# Patient Record
Sex: FEMALE | Race: WHITE | Employment: OTHER | URBAN - NONMETROPOLITAN AREA
[De-identification: names, ages, dates, MRNs, and addresses within clinical notes are randomized per-mention and may not be internally consistent; named-entity substitution may affect disease eponyms.]

---

## 2018-11-08 DIAGNOSIS — H91.93 DECREASED HEARING OF BOTH EARS: Primary | ICD-10-CM

## 2019-02-05 NOTE — PROGRESS NOTES
Otolaryngology Consultation    Patient: Tayler Lizarraga  : 1922    Patient presents with:  Hearing Problem: Pt is here for hearing loss and hearing aid medical clearance.      HPI:  Tayler Lizarraga is a 89 year old female seen today for hearing loss and medical clearance  Tayler has hx of hearing aids for the last 10 years and no longer able to be adjusted. SHe is requesting new aids.   Tayler had hearing loss for several years and noted loss due to illness.   Patient does report she had scarlet fever which contributed to her hearing loss.   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   [facility] otologic surgeries  Hx of Tonsillectomy an Adenoidectomy due to illness.   Denies otorrhea, otalgia. Denies tinnitus.   Denies vertigo. She has Meclizine for PRN use.   No facial paraesthesia or dysphagia.      Hx of hearing loss with her parents.   Allergies   Allergen Reactions     Amoxicillin Hives     Codeine Hives     Latanoprost Other (See Comments)     Itching, burning (not with travatan)     Nitrofurantoin Hives     Sulfacetamide Hives     Tramadol Nausea and Vomiting     Current Outpatient Medications   Medication     Acetaminophen (TYLENOL GO TABS EXTRA STRENGTH PO)     albuterol (PROVENTIL) (2.5 MG/3ML) 0.083% neb solution     aspirin (ASA) 81 MG chewable tablet     atorvastatin (LIPITOR) 20 MG tablet     bimatoprost (LUMIGAN) 0.01 % SOLN     budesonide-formoterol (SYMBICORT) 80-4.5 MCG/ACT Inhaler     calcium carbonate 600 mg-vitamin D 400 units (CALTRATE) 600-400 MG-UNIT per tablet     Cranberry-Vitamin C 250-60 MG CAPS     indomethacin (INDOCIN) 50 MG capsule     ipratropium - albuterol 0.5 mg/2.5 mg/3 mL (DUONEB) 0.5-2.5 (3) MG/3ML neb solution     magnesium hydroxide (MILK OF MAGNESIA) 400 MG/5ML suspension     Magnesium Oxide 250 MG TABS     metolazone (ZAROXOLYN) 2.5 MG tablet     No current facility-administered medications for this visit.        ENT family history reviewed    Social History      Tobacco Use     Smoking status: Not on file   Substance Use Topics     Alcohol use: Not on file     Drug use: Not on file       Review of Systems  ROS: 10 point ROS neg other than the symptoms noted above in the HPI     Physical Exam  /72 (BP Location: Right arm, Patient Position: Sitting, Cuff Size: Adult Regular)   Pulse 71   Temp 98.1  F (36.7  C)   Wt 71 kg (156 lb 8 oz)   SpO2 97%   General - The patient is well nourished and well developed, and appears to have good nutritional status.  Alert and oriented to person and place, answers questions and cooperates with examination appropriately.  Patient is in wheelchair but transfers and ambulates without concern.   Head and Face - Normocephalic and atraumatic, with no gross asymmetry noted.  The facial nerve is intact, with strong symmetric movements.  Voice and Breathing - The patient was breathing comfortably without the use of accessory muscles. There was no wheezing, stridor, or stertor.  The patients voice was clear and strong, and had appropriate pitch and quality.  Ears -The external auditory canals are patent, the tympanic membranes are intact without effusion, retraction or mass.  Bony landmarks are intact. Ears examined under otologic microscopy using otologic speculum, scant cerumen removed. \  Mouth - Examination of the oral cavity showed pink, healthy oral mucosa. No lesions or ulcerations noted.  The tongue was mobile and midline, and the dentition was dentures.   Throat - The walls of the oropharynx were smooth, pink, moist, symmetric, and had no lesions or ulcerations.  The tonsillar pillars and soft palate were symmetric.  The uvula was midline on elevation.    Neck - Normal midline excursion of the laryngotracheal complex during swallowing.  Full range of motion on passive movement.  Palpation of the occipital, submental, submandibular, internal jugular chain, and supraclavicular nodes did not demonstrate any abnormal lymph nodes or  masses.  Palpation of the thyroid was soft and smooth, with no nodules or goiter appreciated.  The trachea was mobile and midline.  Nose - External contour is symmetric, no gross deflection or scars.  Nasal mucosa is pink and moist with no abnormal mucus.  The septum and turbinates were evaluated: No polyps, masses, or purulence noted on examination.    Audiogram  Type Ad Right Type A left  Thresholds are severe right mixed loss and severe to profound mixed loss left.   WRS- poor right and very poor left.       ASSESSMENT:    ICD-10-CM    1. 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 Asymmetrical hearing loss of both ears H91.8X3        Discussed options with Tayler today. She does have noted asymmetry with her hearing. Reviewed consideration for imaging. However, she declines today due to her age and she wishes to obtain hearing aids.     Medical clearance was provided.       I have discussed options with the patient.  MRI of the IAC was declined.  The loss of hearing in the one ear leaves only one that is functioning well.  It becomes imperative that the remaining ear be protected from noise exposure and if hearing loss were to occur in the remaining ear, the patient needs to be seen in a few days so as to commence timely work-up and management.  The specifics of this were reviewed with the patient.      She agrees with this plan.   Return to ENT PRN.       Abigail Mckenzie PA-C  ENT  [facility], [place]  [phone]

## 2019-02-06 ENCOUNTER — OFFICE VISIT (OUTPATIENT)
Dept: AUDIOLOGY | Facility: OTHER | Age: 84
End: 2019-02-06
Attending: AUDIOLOGIST
Payer: MEDICARE

## 2019-02-06 ENCOUNTER — OFFICE VISIT (OUTPATIENT)
Dept: OTOLARYNGOLOGY | Facility: OTHER | Age: 84
End: 2019-02-06
Attending: AUDIOLOGIST
Payer: MEDICARE

## 2019-02-06 VITALS
OXYGEN SATURATION: 97 % | SYSTOLIC BLOOD PRESSURE: 126 MMHG | DIASTOLIC BLOOD PRESSURE: 72 MMHG | TEMPERATURE: 98.1 F | HEART RATE: 71 BPM | WEIGHT: 156.5 LBS

## 2019-02-06 DIAGNOSIS — H90.6 MIXED HEARING LOSS, BILATERAL: Primary | ICD-10-CM

## 2019-02-06 DIAGNOSIS — H90.3 SENSORINEURAL HEARING LOSS, ASYMMETRICAL: Primary | ICD-10-CM

## 2019-02-06 PROCEDURE — 92557 COMPREHENSIVE HEARING TEST: CPT | Performed by: AUDIOLOGIST

## 2019-02-06 PROCEDURE — 92550 TYMPANOMETRY & REFLEX THRESH: CPT | Performed by: AUDIOLOGIST

## 2019-02-06 PROCEDURE — V5275 EAR IMPRESSION: HCPCS | Performed by: AUDIOLOGIST

## 2019-02-06 PROCEDURE — 99214 OFFICE O/P EST MOD 30 MIN: CPT | Performed by: PHYSICIAN ASSISTANT

## 2019-02-06 PROCEDURE — G0463 HOSPITAL OUTPT CLINIC VISIT: HCPCS | Mod: 25

## 2019-02-06 RX ORDER — IPRATROPIUM BROMIDE AND ALBUTEROL SULFATE 2.5; .5 MG/3ML; MG/3ML
SOLUTION RESPIRATORY (INHALATION)
COMMUNITY
Start: 2019-01-03

## 2019-02-06 RX ORDER — INDOMETHACIN 50 MG/1
CAPSULE ORAL
Refills: 0 | COMMUNITY
Start: 2018-10-02

## 2019-02-06 RX ORDER — ATORVASTATIN CALCIUM 20 MG/1
TABLET, FILM COATED ORAL
COMMUNITY
Start: 2019-01-31

## 2019-02-06 RX ORDER — METOLAZONE 2.5 MG/1
TABLET ORAL
COMMUNITY
Start: 2018-06-15

## 2019-02-06 RX ORDER — LIDOCAINE HCL 4 %
CREAM (GRAM) TOPICAL
COMMUNITY
Start: 2018-12-13

## 2019-02-06 RX ORDER — ASPIRIN 81 MG/1
TABLET, CHEWABLE ORAL
COMMUNITY
Start: 2018-08-30

## 2019-02-06 RX ORDER — BUDESONIDE AND FORMOTEROL FUMARATE DIHYDRATE 80; 4.5 UG/1; UG/1
2 AEROSOL RESPIRATORY (INHALATION)
COMMUNITY
Start: 2019-01-03

## 2019-02-06 RX ORDER — ALBUTEROL SULFATE 0.83 MG/ML
SOLUTION RESPIRATORY (INHALATION)
COMMUNITY
Start: 2015-05-12

## 2019-02-06 ASSESSMENT — PAIN SCALES - GENERAL: PAINLEVEL: NO PAIN (0)

## 2019-02-06 NOTE — PROGRESS NOTES
Audiology Evaluation Completed. Please refer SCANNED AUDIOGRAM and/or TYMPANOGRAM for BACKGROUND, RESULTS, RECOMMENDATIONS.      Rosio DICKENS, [facility]-A  Audiologist #[id number]

## 2019-02-06 NOTE — NURSING NOTE
Chief Complaint   Patient presents with     Hearing Problem     Pt is here for hearing loss and hearing aid medical clearance.       Initial /72 (BP Location: Right arm, Patient Position: Sitting, Cuff Size: Adult Regular)   Pulse 71   Temp 98.1  F (36.7  C)   Wt 71 kg (156 lb 8 oz)   SpO2 97%  There is no height or weight on file to calculate BMI.  Medication Reconciliation: complete    Hanna Galeano LPN

## 2019-02-06 NOTE — LETTER
2019         RE: Tayler Lizarraga  [address]  [place] [place] [address]        Dear Colleague,    Thank you for referring your patient, Tayler Lizarraga, to the [facility]. Please see a copy of my visit note below.    Otolaryngology Consultation    Patient: Tayler Lizarraga  : 1922    Patient presents with:  Hearing Problem: Pt is here for hearing loss and hearing aid medical clearance.      HPI:  Tayler Lizarraga is a 89 year old female seen today for hearing loss and medical clearance  Tayler has hx of hearing aids for the last 10 years and no longer able to be adjusted. SHe is requesting new aids.   Tayler had hearing loss for several years and noted loss due to illness.   Patient does report she had scarlet fever which contributed to her hearing loss.   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   Scott otologic surgeries  Hx of Tonsillectomy an Adenoidectomy due to illness.   Denies otorrhea, otalgia. Denies tinnitus.   Denies vertigo. She has Meclizine for PRN use.   No facial paraesthesia or dysphagia.      Hx of hearing loss with her parents.   Allergies   Allergen Reactions     Amoxicillin Hives     Codeine Hives     Latanoprost Other (See Comments)     Itching, burning (not with travatan)     Nitrofurantoin Hives     Sulfacetamide Hives     Tramadol Nausea and Vomiting     Current Outpatient Medications   Medication     Acetaminophen (TYLENOL GO TABS EXTRA STRENGTH PO)     albuterol (PROVENTIL) (2.5 MG/3ML) 0.083% neb solution     aspirin (ASA) 81 MG chewable tablet     atorvastatin (LIPITOR) 20 MG tablet     bimatoprost (LUMIGAN) 0.01 % SOLN     budesonide-formoterol (SYMBICORT) 80-4.5 MCG/ACT Inhaler     calcium carbonate 600 mg-vitamin D 400 units (CALTRATE) 600-400 MG-UNIT per tablet     Cranberry-Vitamin C 250-60 MG CAPS     indomethacin (INDOCIN) 50 MG capsule     ipratropium - albuterol 0.5 mg/2.5 mg/3 mL (DUONEB) 0.5-2.5 (3) MG/3ML neb solution     magnesium  hydroxide (MILK OF MAGNESIA) 400 MG/5ML suspension     Magnesium Oxide 250 MG TABS     metolazone (ZAROXOLYN) 2.5 MG tablet     No current facility-administered medications for this visit.        ENT family history reviewed    Social History     Tobacco Use     Smoking status: Not on file   Substance Use Topics     Alcohol use: Not on file     Drug use: Not on file       Review of Systems  ROS: 10 point ROS neg other than the symptoms noted above in the HPI     Physical Exam  /72 (BP Location: Right arm, Patient Position: Sitting, Cuff Size: Adult Regular)   Pulse 71   Temp 98.1  F (36.7  C)   Wt 71 kg (156 lb 8 oz)   SpO2 97%   General - The patient is well nourished and well developed, and appears to have good nutritional status.  Alert and oriented to person and place, answers questions and cooperates with examination appropriately.  Patient is in wheelchair but transfers and ambulates without concern.   Head and Face - Normocephalic and atraumatic, with no gross asymmetry noted.  The facial nerve is intact, with strong symmetric movements.  Voice and Breathing - The patient was breathing comfortably without the use of accessory muscles. There was no wheezing, stridor, or stertor.  The patients voice was clear and strong, and had appropriate pitch and quality.  Ears -The external auditory canals are patent, the tympanic membranes are intact without effusion, retraction or mass.  Bony landmarks are intact. Ears examined under otologic microscopy using otologic speculum, scant cerumen removed. \  Mouth - Examination of the oral cavity showed pink, healthy oral mucosa. No lesions or ulcerations noted.  The tongue was mobile and midline, and the dentition was dentures.   Throat - The walls of the oropharynx were smooth, pink, moist, symmetric, and had no lesions or ulcerations.  The tonsillar pillars and soft palate were symmetric.  The uvula was midline on elevation.    Neck - Normal midline excursion of  the laryngotracheal complex during swallowing.  Full range of motion on passive movement.  Palpation of the occipital, submental, submandibular, internal jugular chain, and supraclavicular nodes did not demonstrate any abnormal lymph nodes or masses.  Palpation of the thyroid was soft and smooth, with no nodules or goiter appreciated.  The trachea was mobile and midline.  Nose - External contour is symmetric, no gross deflection or scars.  Nasal mucosa is pink and moist with no abnormal mucus.  The septum and turbinates were evaluated: No polyps, masses, or purulence noted on examination.    Audiogram  Type Ad Right Type A left  Thresholds are severe right mixed loss and severe to profound mixed loss left.   WRS- poor right and very poor left.       ASSESSMENT:    ICD-10-CM    1. 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 Asymmetrical hearing loss of both ears H91.8X3        Discussed options with Tayler today. She does have noted asymmetry with her hearing. Reviewed consideration for imaging. However, she declines today due to her age and she wishes to obtain hearing aids.     Medical clearance was provided.       I have discussed options with the patient.  MRI of the IAC was declined.  The loss of hearing in the one ear leaves only one that is functioning well.  It becomes imperative that the remaining ear be protected from noise exposure and if hearing loss were to occur in the remaining ear, the patient needs to be seen in a few days so as to commence timely work-up and management.  The specifics of this were reviewed with the patient.      She agrees with this plan.   Return to ENT PRN.       Abigail Mckenzie PA-C  ENT  [facility]  [phone]            Again, thank you for allowing me to participate in the care of your patient.        Sincerely,        Abigail Mckenzie PA-C

## 2019-02-19 ENCOUNTER — OFFICE VISIT (OUTPATIENT)
Dept: AUDIOLOGY | Facility: OTHER | Age: 84
End: 2019-02-19
Attending: AUDIOLOGIST
Payer: MEDICARE

## 2019-02-19 DIAGNOSIS — H90.3 SENSORINEURAL HEARING LOSS, ASYMMETRICAL: Primary | ICD-10-CM

## 2019-02-19 PROCEDURE — V5160 DISPENSING FEE BINAURAL: HCPCS | Performed by: AUDIOLOGIST

## 2019-02-19 PROCEDURE — V5264 EAR MOLD/INSERT: HCPCS | Mod: RT | Performed by: AUDIOLOGIST

## 2019-02-19 PROCEDURE — V5260 HEARING AID, DIGIT, BIN, ITE: HCPCS | Mod: NU | Performed by: AUDIOLOGIST

## 2019-02-19 NOTE — PROGRESS NOTES
AUDIOLOGY REPORT    SUBJECTIVE: Tayler Lizarraga, a 89 year old female, was seen in the Audiology Clinic at [facility] today for a Binaural hearing aid fitting. Previous results have revealed a bilateral  sensorineural hearing loss.     OBJECTIVE:  Prior to fitting, a hearing aid check was performed to ensure device functionality. The hearing aid conformity evaluation was completed.The hearing aids were placed and they provided a good fit. Real-ear-probe-microphone measurements were completed on the [employer] system and were a good match for the right to NAL-NL2 target with soft sounds audible, moderate sounds comfortable, and loud sounds below discomfort. UCLs are verified through maximum power output measures and demonstrate appropriate limiting of loud inputs.    The left aid is returned for [employer] for improved target match. She does not want a BEHIND-THE-EAR hearing aid.   Ms. Lizarraga was oriented to proper hearing aid use, care, cleaning (no water, dry brush), batteries (size 13-24 cells, insertion/removal, toxicity, low-battery signal), aid insertion/removal, user booklet, warranty information, storage cases, and other hearing aid details. The patient confirmed understanding of hearing aid use and care, and showed proper insertion of hearing aid and batteries while in the office today. Ms. Lizarraga reported good volume and sound quality today.    EAR(S) FIT:    MA HEARING AID MAKE: INSERA Full Shell 700  MA HEARING AID MODEL #: INSERA 700 FS    HEARING AID STYLE: Full Shell In-The-Ear    SERIAL NUMBERS: Right:[id number]  ; Left:  [id number]  WARRANTY END DATE: 3/12/2022      ASSESSMENT: Hearing aid fitting completed today. Verification measures were performed.   PLAN: Ms. Lizarraga will return for follow-up in 2-3 weeks for a hearing aid review appointment and refit left aid. Please call this clinic with questions regarding today s appointment.    Rosio Caruso M.S., CCC-A  Audiologist  #[id number]

## 2019-04-02 ENCOUNTER — OFFICE VISIT (OUTPATIENT)
Dept: AUDIOLOGY | Facility: OTHER | Age: 84
End: 2019-04-02
Attending: AUDIOLOGIST
Payer: MEDICARE

## 2019-04-02 DIAGNOSIS — H90.3 SENSORINEURAL HEARING LOSS, ASYMMETRICAL: Primary | ICD-10-CM

## 2019-04-02 PROCEDURE — V5299 HEARING SERVICE: HCPCS | Performed by: AUDIOLOGIST

## 2019-04-02 NOTE — PROGRESS NOTES
Background: Hearing aid refit left aid after aid has returned from remake.  Results: Due to soreness in helix area both aids modified. Programmed to user settings. Patient has volume wheel to adjust loudness.  Recommendations: Send in for remake if further soreness issues. Otherwise no concerns.  Rosio Caruso M.S., [facility]-A  Audiologist #[id number]

## 2019-05-17 ENCOUNTER — OFFICE VISIT (OUTPATIENT)
Dept: AUDIOLOGY | Facility: OTHER | Age: 84
End: 2019-05-17
Attending: AUDIOLOGIST
Payer: MEDICARE

## 2019-05-17 DIAGNOSIS — H90.3 SENSORINEURAL HEARING LOSS, ASYMMETRICAL: Primary | ICD-10-CM

## 2019-05-17 PROCEDURE — V5299 HEARING SERVICE: HCPCS | Performed by: AUDIOLOGIST

## 2019-05-17 NOTE — PROGRESS NOTES
AUDIOLOGY REPORT    BACKGROUND INFORMATION: Tayler Lizarraga was seen in the Audiology Clinic at [facility]  on 5/17/2019 for follow-up on remake of both aids as too tight.. The patient reports feedback left.      RESULTS:   Reprogrammed to user settings and due to slit leak with jaw movement-decreased volume-3dBHL. Discussed BEHIND-THE-EAR aid for the left however patient is used to this style and wants to keep.    Cleaning, troubleshooting, volume function, technology function, and to contact Audiology for further needs was reviewed.    Counselled on communicative strategies.    SUMMARY AND RECOMMENDATIONS: A hearing aid  follow-up was performed today and the patient reports satisfaction and wants to keep the hearing aids. he patient will return for follow-up as needed and in 12 months.  Call this clinic with questions regarding today s visit.    Rosio Caruso M.S., [facility]-A  Audiologist #[id number]

## 2021-12-21 NOTE — PROGRESS NOTES
BACKGROUND:  Tayler was seen today for consult regarding hearing aids. The patient notes difficulty with communication in a variety of listening situations and would like to explore possible benefit obtained via use of amplification.      Patient has received medical clearance for hearing aid use from Abigail WONG.  Tayler is a binaural hearing aid candidate and will receive a Hearing Aid Recommendation today to follow [facility] [place] CONTRACT GUIDELINES .    RESULTS:  Brochure form [place] [employer] titled 'Legal rights and consumer information about purchasing a hearing instrument ' verbally reviewed and given to patient. Trial Period, cancellation fee, warranties highlighted. Estimated insurance coverage for hearing aids reviewed.      Patient risk factors have been provided to the patient in writing prior to the sale of the hearing aid per FDA regulation. The risk factors are also available in the User Instructional Booklet to be presented on the day of the hearing aid fitting.    ABN (Advanced Beneficiary Notice of Non-Coverage) completed and copy given to patient.    Thru use of hearing aids she would like to improve her ability to hear:     where there are groups and noise.     She would like to hear the television at a lower volume so that she can enjoy this activity with other people.     Tayler also reports trouble hearing in the car, at home, and on the telephone if there is not a volume control.      Discussed hearing aid styles, technology, features, and options at length with Tayler.  Sample devices were shown. Pros/Cons of options reviewed.  Expectations for amplification discussed. .Also discussed the importance of binaural amplification for hearing speech in the presence of background noise and localizing the directions of sounds.  Wireless options were also discussed at length and sample devices were shown.   Patient is 89 years old and accustomed to Full Shell In-The-Ear  hearing aids which she manages on her own. Discussed word discrimination scores and BEHIND-THE-EAR hearing aids. She does not want BEHIND-THE-EAR hearing aids as she feels she would not like them or be able to use them on her own like she can now. Discussed the poor word discrimination and expectations. She'd like a hearing aid left ear for sound awareness and she is accustomed to them both.      Hearing Aid Recommendations: Hearing Aid Recommendation reviewed with patient. Pt chose to proceed with order and Purchase Agreement completed. Copies provided to patient.      Hearing Aids:  Binaural Unitron FS Insera 700  Color: beige  Battery Size: 13      Ear Impression(s):  Otoscopy revealed clear ear canals bilaterally and otoblock placed appropriately.      Ear Impressions were taken and no concerns thru otoscopic visualization after removal. Tthe hearing aid(s) and earmold(s) were ordered.     RECOMMENDATIONS:  The 45 day trial period was explained to patient, and they expressed understanding. Ms. Lizarraga signed the Hearing Aid Purchase Agreement and was given a copy, as well as details on her hearing aids. Patient was counseled that exact out of pocket amounts cannot be determined for hearing aid claims being sent to insurance. Any insurance coverage information presented to the patient is an estimate only, and is not a guarantee of payment. Patient has been advised to check with their own insurance.    Recommended to return to clinic in 2 weeks for hearing aid fitting, programming and orientation.    Rosio Caruso M.S.,[facility]-A  Audiologist #[id number]       Total Number Of Fractions: 20